# Patient Record
Sex: FEMALE | Race: WHITE | NOT HISPANIC OR LATINO | Employment: OTHER | URBAN - METROPOLITAN AREA
[De-identification: names, ages, dates, MRNs, and addresses within clinical notes are randomized per-mention and may not be internally consistent; named-entity substitution may affect disease eponyms.]

---

## 2017-02-20 ENCOUNTER — HOSPITAL ENCOUNTER (OUTPATIENT)
Dept: LAB | Facility: HOSPITAL | Age: 27
Discharge: HOME | End: 2017-02-20
Attending: NURSE PRACTITIONER | Admitting: NURSE PRACTITIONER

## 2017-02-20 LAB
ABO + RH BLD: NORMAL
ABO + RH BLD: NORMAL

## 2017-02-22 ENCOUNTER — HOSPITAL ENCOUNTER (OUTPATIENT)
Dept: LAB | Facility: HOSPITAL | Age: 27
Discharge: HOME | End: 2017-02-22
Attending: NURSE PRACTITIONER | Admitting: NURSE PRACTITIONER

## 2017-10-28 ENCOUNTER — HOSPITAL ENCOUNTER (OUTPATIENT)
Dept: LAB | Facility: HOSPITAL | Age: 27
Discharge: HOME | End: 2017-10-28
Attending: OBSTETRICS & GYNECOLOGY | Admitting: OBSTETRICS & GYNECOLOGY

## 2017-10-28 LAB — GLUCOSE 1H P 75 G GLC PO SERPL-MCNC: 73 MG/DL (ref 65–139)

## 2021-07-20 ENCOUNTER — OFFICE VISIT (OUTPATIENT)
Dept: FAMILY MEDICINE CLINIC | Facility: CLINIC | Age: 31
End: 2021-07-20

## 2021-07-20 VITALS
BODY MASS INDEX: 21.38 KG/M2 | SYSTOLIC BLOOD PRESSURE: 110 MMHG | HEIGHT: 66 IN | TEMPERATURE: 98 F | DIASTOLIC BLOOD PRESSURE: 78 MMHG | WEIGHT: 133 LBS | HEART RATE: 68 BPM | OXYGEN SATURATION: 98 %

## 2021-07-20 DIAGNOSIS — Z12.4 SCREENING FOR CERVICAL CANCER: ICD-10-CM

## 2021-07-20 DIAGNOSIS — Z13.220 SCREENING CHOLESTEROL LEVEL: ICD-10-CM

## 2021-07-20 DIAGNOSIS — I49.9 IRREGULAR HEART BEAT: ICD-10-CM

## 2021-07-20 DIAGNOSIS — Z11.4 SCREENING FOR HIV (HUMAN IMMUNODEFICIENCY VIRUS): ICD-10-CM

## 2021-07-20 DIAGNOSIS — Z00.01 ENCOUNTER FOR ANNUAL GENERAL MEDICAL EXAMINATION WITH ABNORMAL FINDINGS IN ADULT: Primary | ICD-10-CM

## 2021-07-20 DIAGNOSIS — E55.9 VITAMIN D DEFICIENCY: ICD-10-CM

## 2021-07-20 DIAGNOSIS — Z11.59 ENCOUNTER FOR HEPATITIS C VIRUS SCREENING TEST FOR HIGH RISK PATIENT: ICD-10-CM

## 2021-07-20 DIAGNOSIS — Z13.1 SCREENING FOR DIABETES MELLITUS: ICD-10-CM

## 2021-07-20 DIAGNOSIS — Z91.89 ENCOUNTER FOR HEPATITIS C VIRUS SCREENING TEST FOR HIGH RISK PATIENT: ICD-10-CM

## 2021-07-20 PROBLEM — O28.3 ABNORMAL FETAL ULTRASOUND: Status: RESOLVED | Noted: 2017-10-11 | Resolved: 2021-07-20

## 2021-07-20 PROBLEM — O28.9 ABNORMAL ANTENATAL TEST: Status: RESOLVED | Noted: 2017-10-26 | Resolved: 2021-07-20

## 2021-07-20 PROBLEM — O28.3 ABNORMAL FETAL ULTRASOUND: Status: ACTIVE | Noted: 2017-10-11

## 2021-07-20 PROBLEM — O09.299 HISTORY OF MISCARRIAGE, CURRENTLY PREGNANT: Status: RESOLVED | Noted: 2017-02-01 | Resolved: 2021-07-20

## 2021-07-20 PROBLEM — O28.5 ABNORMAL GENETIC TEST DURING PREGNANCY: Status: RESOLVED | Noted: 2018-01-30 | Resolved: 2021-07-20

## 2021-07-20 PROBLEM — IMO0002 CHOROID PLEXUS CYST OF FETUS: Status: ACTIVE | Noted: 2017-10-26

## 2021-07-20 PROBLEM — Z87.42 HISTORY OF OVARIAN CYST: Status: ACTIVE | Noted: 2017-01-01

## 2021-07-20 PROBLEM — IMO0002 CHOROID PLEXUS CYST OF FETUS: Status: RESOLVED | Noted: 2017-10-26 | Resolved: 2021-07-20

## 2021-07-20 PROBLEM — O09.299 HISTORY OF MISCARRIAGE, CURRENTLY PREGNANT: Status: ACTIVE | Noted: 2017-02-01

## 2021-07-20 PROBLEM — O28.9 ABNORMAL ANTENATAL TEST: Status: ACTIVE | Noted: 2017-10-26

## 2021-07-20 PROBLEM — O28.5 ABNORMAL GENETIC TEST DURING PREGNANCY: Status: ACTIVE | Noted: 2018-01-30

## 2021-07-20 PROCEDURE — 99385 PREV VISIT NEW AGE 18-39: CPT | Performed by: PREVENTIVE MEDICINE

## 2021-07-20 PROCEDURE — 93000 ELECTROCARDIOGRAM COMPLETE: CPT | Performed by: PREVENTIVE MEDICINE

## 2021-07-20 PROCEDURE — 99213 OFFICE O/P EST LOW 20 MIN: CPT | Performed by: PREVENTIVE MEDICINE

## 2021-07-20 NOTE — PROGRESS NOTES
Procedure     ECG 12 Lead    Date/Time: 7/20/2021 1:10 PM  Performed by: Melany Brown MD  Authorized by: Melany Brown MD   Comparison: not compared with previous ECG   Rhythm: sinus rhythm and sinus bradycardia  Rate: normal  Conduction: conduction normal  ST Segments: ST segments normal  T Waves: T waves normal  QRS axis: normal  Other: no other findings  Other findings: left atrial abnormality    Clinical impression: abnormal EKG

## 2021-07-20 NOTE — PROGRESS NOTES
"Kim German is a 31 y.o. female presents for   Chief Complaint   Patient presents with   • Annual Exam     new patient     New patient transitioning into care for an annual wellness exam has been advised to wear sunscreen and to use a seatbelt.  She feels healthy for the most part and does run every day at which time she does not experience any chest palpitations or irregular heartbeat at night when she lays down on her left side she notices her heart is beating hard and irregular and seems to pause or turn over in her chest.  Patient has not had the Covid vaccination as she is planning to become pregnant we advised that she does talk with her gynecologist obstetrician to see what they recommend prior to her becoming pregnant.  EKG showed some left atrial enlargement and bradycardia due to her running most likely.  Patient was placed in 2-week Holter monitor and is to follow-up with cardiology.  Several members of her family have had thyroid difficulty and that will be checked as well today.  Health Maintenance Due   Topic Date Due   • ANNUAL PHYSICAL  Never done   • COVID-19 Vaccine (1) Never done   • HEPATITIS C SCREENING  Never done   • PAP SMEAR  Never done       History of Present Illness     Vitals:    07/20/21 0938 07/20/21 0945   BP: 102/70 110/78   BP Location: Right arm Left arm   Patient Position: Sitting Sitting   Cuff Size: Adult Adult   Pulse: 68    Temp: 98 °F (36.7 °C)    SpO2: 98%    Weight: 60.3 kg (133 lb)    Height: 167.6 cm (66\")      Body mass index is 21.47 kg/m².    No current outpatient medications on file prior to visit.     No current facility-administered medications on file prior to visit.       The following portions of the patient's history were reviewed and updated as appropriate: allergies, current medications, past family history, past medical history, past social history, past surgical history and problem list.    Review of Systems   Constitutional: Negative.  "   HENT: Negative.  Negative for sinus pressure and sore throat.    Eyes: Negative.    Respiratory: Negative.  Negative for cough.    Cardiovascular: Positive for palpitations.   Gastrointestinal: Negative.    Endocrine: Negative.    Genitourinary: Negative.    Musculoskeletal: Negative.    Skin: Negative.    Allergic/Immunologic: Positive for environmental allergies.   Neurological: Negative.    Hematological: Negative.    Psychiatric/Behavioral: The patient is nervous/anxious.        Objective   Physical Exam  Vitals reviewed.   Constitutional:       General: She is not in acute distress.     Appearance: Normal appearance. She is well-developed. She is not ill-appearing or toxic-appearing.   HENT:      Head: Normocephalic and atraumatic.      Right Ear: Tympanic membrane, ear canal and external ear normal.      Left Ear: Tympanic membrane, ear canal and external ear normal.      Nose: Nose normal.   Eyes:      Extraocular Movements: Extraocular movements intact.      Conjunctiva/sclera: Conjunctivae normal.      Pupils: Pupils are equal, round, and reactive to light.   Cardiovascular:      Rate and Rhythm: Normal rate and regular rhythm.      Heart sounds: Normal heart sounds.   Pulmonary:      Effort: Pulmonary effort is normal.      Breath sounds: Normal breath sounds.   Abdominal:      General: Bowel sounds are normal. There is no distension.      Palpations: Abdomen is soft. There is no mass.      Tenderness: There is no abdominal tenderness.   Musculoskeletal:         General: Normal range of motion.      Cervical back: Neck supple.   Skin:     General: Skin is warm.   Neurological:      General: No focal deficit present.      Mental Status: She is alert and oriented to person, place, and time.   Psychiatric:         Mood and Affect: Mood normal.         Behavior: Behavior normal.       PHQ-9 Total Score: 0    Assessment/Plan   Diagnoses and all orders for this visit:    1. Encounter for annual general medical  examination with abnormal findings in adult (Primary)  Comments:  Patient has been advised to wear sunscreen and seatbelt  Orders:  -     CBC Auto Differential    2. Screening for cervical cancer  Comments:  get records from GYN  Orders:  -     Ambulatory Referral to Gynecology    3. Screening for HIV (human immunodeficiency virus)  -     HIV-1 & HIV-2 Antibodies    4. Encounter for hepatitis C virus screening test for high risk patient  -     Hepatitis C Antibody    5. Screening cholesterol level  -     Lipid Panel    6. Screening for diabetes mellitus  -     Comprehensive Metabolic Panel    7. Vitamin D deficiency  -     Vitamin D 25 Hydroxy    8. Irregular heart beat  Comments:  Patient has noticed irregular and forceful heartbeat when she lies down to sleep.  Avid runner and no problems and she is exercising.  No family history of hear  Orders:  -     Sedimentation Rate  -     C-reactive Protein  -     TSH  -     Magnesium  -     Holter Monitor - 72 Hour Up To 15 Days; Future  -     Ambulatory Referral to Cardiology  -     ECG 12 Lead        Patient Instructions   Speak with Dr. Ferrell about Covid vaccination    12 hour fast for labs.

## 2021-07-21 ENCOUNTER — CLINICAL SUPPORT (OUTPATIENT)
Dept: FAMILY MEDICINE CLINIC | Facility: CLINIC | Age: 31
End: 2021-07-21

## 2021-07-21 DIAGNOSIS — Z87.42 HISTORY OF OVARIAN CYST: ICD-10-CM

## 2021-07-21 DIAGNOSIS — I49.9 IRREGULAR HEART BEAT: ICD-10-CM

## 2021-07-21 PROCEDURE — 86803 HEPATITIS C AB TEST: CPT | Performed by: PREVENTIVE MEDICINE

## 2021-07-21 PROCEDURE — 36415 COLL VENOUS BLD VENIPUNCTURE: CPT | Performed by: PREVENTIVE MEDICINE

## 2021-07-21 PROCEDURE — 80061 LIPID PANEL: CPT | Performed by: PREVENTIVE MEDICINE

## 2021-07-21 PROCEDURE — 84443 ASSAY THYROID STIM HORMONE: CPT | Performed by: PREVENTIVE MEDICINE

## 2021-07-21 PROCEDURE — G0432 EIA HIV-1/HIV-2 SCREEN: HCPCS | Performed by: PREVENTIVE MEDICINE

## 2021-07-21 PROCEDURE — 83735 ASSAY OF MAGNESIUM: CPT | Performed by: PREVENTIVE MEDICINE

## 2021-07-21 PROCEDURE — 82306 VITAMIN D 25 HYDROXY: CPT | Performed by: PREVENTIVE MEDICINE

## 2021-07-21 PROCEDURE — 80053 COMPREHEN METABOLIC PANEL: CPT | Performed by: PREVENTIVE MEDICINE

## 2021-07-21 PROCEDURE — 85652 RBC SED RATE AUTOMATED: CPT | Performed by: PREVENTIVE MEDICINE

## 2021-07-21 PROCEDURE — 86140 C-REACTIVE PROTEIN: CPT | Performed by: PREVENTIVE MEDICINE

## 2021-07-21 PROCEDURE — 85025 COMPLETE CBC W/AUTO DIFF WBC: CPT | Performed by: PREVENTIVE MEDICINE

## 2021-07-21 NOTE — PROGRESS NOTES
Venipuncture Blood Specimen Collection  Venipuncture performed in left arm by Mariana Nagel MA with good hemostasis. Patient tolerated the procedure well without complications.   07/21/21   ELIE Garrett MD

## 2021-07-22 ENCOUNTER — TELEPHONE (OUTPATIENT)
Dept: FAMILY MEDICINE CLINIC | Facility: CLINIC | Age: 31
End: 2021-07-22

## 2021-07-22 LAB
25(OH)D3 SERPL-MCNC: 37 NG/ML (ref 30–100)
ALBUMIN SERPL-MCNC: 4.8 G/DL (ref 3.5–5.2)
ALBUMIN/GLOB SERPL: 2.5 G/DL
ALP SERPL-CCNC: 43 U/L (ref 39–117)
ALT SERPL W P-5'-P-CCNC: 9 U/L (ref 1–33)
ANION GAP SERPL CALCULATED.3IONS-SCNC: 8.9 MMOL/L (ref 5–15)
AST SERPL-CCNC: 18 U/L (ref 1–32)
BASOPHILS # BLD AUTO: 0.07 10*3/MM3 (ref 0–0.2)
BASOPHILS NFR BLD AUTO: 1.6 % (ref 0–1.5)
BILIRUB SERPL-MCNC: 0.5 MG/DL (ref 0–1.2)
BUN SERPL-MCNC: 14 MG/DL (ref 6–20)
BUN/CREAT SERPL: 17.5 (ref 7–25)
CALCIUM SPEC-SCNC: 9.2 MG/DL (ref 8.6–10.5)
CHLORIDE SERPL-SCNC: 103 MMOL/L (ref 98–107)
CHOLEST SERPL-MCNC: 165 MG/DL (ref 0–200)
CO2 SERPL-SCNC: 26.1 MMOL/L (ref 22–29)
CREAT SERPL-MCNC: 0.8 MG/DL (ref 0.57–1)
CRP SERPL-MCNC: <0.3 MG/DL (ref 0–0.5)
DEPRECATED RDW RBC AUTO: 43.8 FL (ref 37–54)
EOSINOPHIL # BLD AUTO: 0.2 10*3/MM3 (ref 0–0.4)
EOSINOPHIL NFR BLD AUTO: 4.5 % (ref 0.3–6.2)
ERYTHROCYTE [DISTWIDTH] IN BLOOD BY AUTOMATED COUNT: 12.7 % (ref 12.3–15.4)
ERYTHROCYTE [SEDIMENTATION RATE] IN BLOOD: <1 MM/HR (ref 0–20)
GFR SERPL CREATININE-BSD FRML MDRD: 83 ML/MIN/1.73
GLOBULIN UR ELPH-MCNC: 1.9 GM/DL
GLUCOSE SERPL-MCNC: 85 MG/DL (ref 65–99)
HCT VFR BLD AUTO: 38.6 % (ref 34–46.6)
HCV AB SER DONR QL: NORMAL
HDLC SERPL-MCNC: 69 MG/DL (ref 40–60)
HGB BLD-MCNC: 13.1 G/DL (ref 12–15.9)
HIV1+2 AB SER QL: NORMAL
IMM GRANULOCYTES # BLD AUTO: 0.01 10*3/MM3 (ref 0–0.05)
IMM GRANULOCYTES NFR BLD AUTO: 0.2 % (ref 0–0.5)
LDLC SERPL CALC-MCNC: 85 MG/DL (ref 0–100)
LDLC/HDLC SERPL: 1.24 {RATIO}
LYMPHOCYTES # BLD AUTO: 1.21 10*3/MM3 (ref 0.7–3.1)
LYMPHOCYTES NFR BLD AUTO: 27.5 % (ref 19.6–45.3)
MAGNESIUM SERPL-MCNC: 2.2 MG/DL (ref 1.6–2.6)
MCH RBC QN AUTO: 31.9 PG (ref 26.6–33)
MCHC RBC AUTO-ENTMCNC: 33.9 G/DL (ref 31.5–35.7)
MCV RBC AUTO: 93.9 FL (ref 79–97)
MONOCYTES # BLD AUTO: 0.34 10*3/MM3 (ref 0.1–0.9)
MONOCYTES NFR BLD AUTO: 7.7 % (ref 5–12)
NEUTROPHILS NFR BLD AUTO: 2.57 10*3/MM3 (ref 1.7–7)
NEUTROPHILS NFR BLD AUTO: 58.5 % (ref 42.7–76)
NRBC BLD AUTO-RTO: 0 /100 WBC (ref 0–0.2)
PLATELET # BLD AUTO: 219 10*3/MM3 (ref 140–450)
PMV BLD AUTO: 11.5 FL (ref 6–12)
POTASSIUM SERPL-SCNC: 4.3 MMOL/L (ref 3.5–5.2)
PROT SERPL-MCNC: 6.7 G/DL (ref 6–8.5)
RBC # BLD AUTO: 4.11 10*6/MM3 (ref 3.77–5.28)
SODIUM SERPL-SCNC: 138 MMOL/L (ref 136–145)
TRIGL SERPL-MCNC: 52 MG/DL (ref 0–150)
TSH SERPL DL<=0.05 MIU/L-ACNC: 2.58 UIU/ML (ref 0.27–4.2)
VLDLC SERPL-MCNC: 11 MG/DL (ref 5–40)
WBC # BLD AUTO: 4.4 10*3/MM3 (ref 3.4–10.8)

## 2021-07-22 NOTE — TELEPHONE ENCOUNTER
HUB TO READ    ----- Message from Melany Bronw MD sent at 7/22/2021  7:13 AM EDT -----  Labs all look normal, including thyroid-followup as planned

## 2021-08-17 ENCOUNTER — PATIENT ROUNDING (BHMG ONLY) (OUTPATIENT)
Dept: FAMILY MEDICINE CLINIC | Facility: CLINIC | Age: 31
End: 2021-08-17

## 2021-08-17 NOTE — PROGRESS NOTES
August 17, 2021    Hello, may I speak with Salvador German?    My name is Clau Smith Practice Manager      I am  with [facility]  [address] [place] [address] [place] [id number].    Before we get started may I verify your date of birth? 1990    I am calling to officially welcome you to our practice and ask about your recent visit. Is this a good time to talk? yes    Tell me about your visit with us. What things went well?  no changes everything went great . No concerns       We're always looking for ways to make our patients' experiences even better. Do you have recommendations on ways we may improve?  no everything went great    Overall were you satisfied with your first visit to our practice? yes       I appreciate you taking the time to speak with me today. Is there anything else I can do for you? no      Thank you, and have a great day.

## 2021-09-01 ENCOUNTER — TELEPHONE (OUTPATIENT)
Dept: FAMILY MEDICINE CLINIC | Facility: CLINIC | Age: 31
End: 2021-09-01

## 2022-06-29 ENCOUNTER — TELEPHONE (OUTPATIENT)
Dept: FAMILY MEDICINE CLINIC | Facility: CLINIC | Age: 32
End: 2022-06-29

## 2022-06-29 NOTE — TELEPHONE ENCOUNTER
Caller:     Relationship:    Salvador German (Self) [phone] (H)       What is the medical concern/diagnosis:     What specialty or service is being requested:   CARDIOLOGY    What is the provider, practice or medical service name:     What is the office location:     What is the office phone number: *    Any additional details:  WAS NOT SURE IF THE ONE IN THE SYSTEM HAD  YET      PLEASE ADVISE

## 2022-06-29 NOTE — TELEPHONE ENCOUNTER
Provided patient with number to  Scheduling and advised if order is to old then patient would need to schedule and office visit to discuss referral.

## 2022-08-01 ENCOUNTER — OFFICE VISIT (OUTPATIENT)
Dept: CARDIOLOGY | Facility: CLINIC | Age: 32
End: 2022-08-01

## 2022-08-01 VITALS
HEIGHT: 66 IN | SYSTOLIC BLOOD PRESSURE: 104 MMHG | DIASTOLIC BLOOD PRESSURE: 70 MMHG | WEIGHT: 159 LBS | HEART RATE: 73 BPM | BODY MASS INDEX: 25.55 KG/M2

## 2022-08-01 DIAGNOSIS — R00.2 PALPITATIONS: Primary | ICD-10-CM

## 2022-08-01 DIAGNOSIS — R01.1 HEART MURMUR: ICD-10-CM

## 2022-08-01 PROCEDURE — 99204 OFFICE O/P NEW MOD 45 MIN: CPT | Performed by: INTERNAL MEDICINE

## 2022-08-01 PROCEDURE — 93000 ELECTROCARDIOGRAM COMPLETE: CPT | Performed by: INTERNAL MEDICINE

## 2022-08-01 RX ORDER — PRENATAL VIT NO.126/IRON/FOLIC 28MG-0.8MG
TABLET ORAL DAILY
COMMUNITY

## 2022-08-01 NOTE — PROGRESS NOTES
[facility] New Patient Office Note     Encounter Date:22  Patient:Salvador German  :1990  MRN:[id number]    Referring Provider: Melany Brown MD    Consulted for: Palpitations    Chief Complaint:   Chief Complaint   Patient presents with   • Irregular Heart Beat     History of Presenting Illness:      Ms. German is a 32 y.o. woman with no significant past medical history presents to our office for initial valuation regarding symptoms of palpitations.  She is actually been having palpitations for about a year and is currently 7 months pregnant with planned  at the end of September.  In general she is doing well with her pregnancy.  She does notice she has been having continued palpitations that have been a little bit worse as of late.  She does notice they tend to be worse at night typically if she is laying on her left side.  Again these were present even before being pregnant and she was concerned given that she has a strong family history of atrial fibrillation.  Both her grandparents, father, and even her sister is only a few years older than her in her 30s also was recently diagnosed with atrial fibrillation.  She is wanting to make sure that this was not the case for her and also wanted to make sure that her heart was healthy and strong enough for her pregnancy.  This is actually her fourth pregnancy.  All other pregnancies have been uncomplicated beyond some borderline gestational diabetes.    Review of Systems:  Review of Systems   Constitutional: Negative.   HENT: Negative.    Eyes: Negative.    Cardiovascular: Positive for palpitations.   Respiratory: Negative.    Endocrine: Negative.    Hematologic/Lymphatic: Negative.    Skin: Negative.    Musculoskeletal: Negative.    Gastrointestinal: Negative.    Genitourinary: Negative.    Neurological: Negative.    Psychiatric/Behavioral: Negative.    Allergic/Immunologic: Negative.        Current Outpatient Medications on  "File Prior to Visit   Medication Sig Dispense Refill   • prenatal vitamin (prenatal, CLASSIC, vitamin) tablet Take  by mouth Daily.       No current facility-administered medications on file prior to visit.       Allergies   Allergen Reactions   • Sulfa Antibiotics Hives       Past Medical History:   Diagnosis Date   • Gestational diabetes    • Irregular heart beat        Past Surgical History:   Procedure Laterality Date   •  SECTION      X 2   • EAR TUBES     • WISDOM TOOTH EXTRACTION         Social History     Socioeconomic History   • Marital status:    Tobacco Use   • Smoking status: Never Smoker   • Smokeless tobacco: Current User   Vaping Use   • Vaping Use: Never used   Substance and Sexual Activity   • Alcohol use: Yes     Comment: socially/caffeine use   • Drug use: Never   • Sexual activity: Defer       Family History   Problem Relation Age of Onset   • Hypothyroidism Mother    • Thyroid cancer Father    • Hypothyroidism Father    • Atrial fibrillation Father    • Atrial fibrillation Sister    • Diabetes Maternal Grandmother    • Atrial fibrillation Paternal Grandmother    • Atrial fibrillation Paternal Grandfather        The following portions of the patient's history were reviewed and updated as appropriate: allergies, current medications, past family history, past medical history, past social history, past surgical history and problem list.       Objective:       Vitals:    22 1051   BP: 104/70   BP Location: Left arm   Patient Position: Sitting   Pulse: 73   Weight: 72.1 kg (159 lb)   Height: 167.6 cm (66\")       Body mass index is 25.66 kg/m².    Physical Exam:  Constitutional: Well appearing, Well-developed, No acute distress   HENT: Oropharynx clear and membrane moist  Eyes: Normal conjunctiva, no sclera icterus.  Neck: Supple, no carotid bruit bilaterally.  Cardiovascular: Regular rate and rhythm, Early peaking systolic murmur over the right upper sternal border, No bilateral " lower extremity edema.  Pulmonary: Normal respiratory effort, Normal lung sounds, no wheezing.  Abdominal: Soft, nontender, no hepatosplenomegaly, liver is non-pulsatile.  Neurological: Alert and orient x 3.   Skin: Warm, dry, no ecchymosis, no rash.  Psych: Appropriate mood and affect. Normal judgment and insight.      Lab Results   Component Value Date     07/21/2021     01/30/2018    K 4.3 07/21/2021    K 3.5 01/30/2018     07/21/2021     01/30/2018    CO2 26.1 07/21/2021    CO2 25 01/30/2018    BUN 14 07/21/2021    BUN 10 01/30/2018    CREATININE 0.80 07/21/2021    CREATININE 0.6 (L) 01/30/2018    EGFRIFNONA 83 07/21/2021    GLUCOSE 85 07/21/2021    CALCIUM 9.2 07/21/2021    CALCIUM 9.0 01/30/2018    ALBUMIN 4.80 07/21/2021    ALBUMIN 4.4 01/30/2018    BILITOT 0.5 07/21/2021    BILITOT 0.4 01/30/2018    AST 18 07/21/2021    AST 25 01/30/2018    ALT 9 07/21/2021    ALT 33 01/30/2018     Lab Results   Component Value Date    WBC 8.35 03/21/2022    WBC 4.40 07/21/2021    HGB 12.7 03/21/2022    HGB 13.1 07/21/2021    HCT 38.0 03/21/2022    HCT 38.6 07/21/2021    MCV 96.4 03/21/2022    MCV 93.9 07/21/2021     03/21/2022     07/21/2021     Lab Results   Component Value Date    CHOL 165 07/21/2021    TRIG 52 07/21/2021    HDL 69 (H) 07/21/2021    LDL 85 07/21/2021     No results found for: PROBNP, BNP  No results found for: CKTOTAL, CKMB, CKMBINDEX, TROPONINI, TROPONINT  Lab Results   Component Value Date    TSH 2.580 07/21/2021         ECG 12 Lead    Date/Time: 8/1/2022 12:07 PM  Performed by: Uche Chavez MD  Authorized by: Uche Chavez MD   Comparison: compared with previous ECG from 7/19/2022  Comparison to previous ECG: Left atrial enlargement no longer seen  Rhythm: sinus rhythm              Assessment:          Diagnosis Plan   1. Palpitations  Adult Transthoracic Echo Complete W/ Cont if Necessary Per Protocol    Holter Monitor - 24 Hour   2. Heart murmur  Adult  Transthoracic Echo Complete W/ Cont if Necessary Per Protocol          Plan:       Ms. German is a 32 y.o. woman with no significant past medical history presents to our office for initial valuation regarding symptoms of palpitations.  Overall hard to pin down exactly what her symptoms may be.  They simply may be APCs or even a positional issue with her being pregnant and laying on her left side.  That being said she does have a very strong family history of atrial fibrillation and she does have an EKG which did have left atrial enlargement which could simply be lead placement but given her family history, symptoms, and heart murmur on exam it would not be unreasonable to assess for normal heart structure with an echocardiogram.  This would help us define her heart murmur better which again hopefully just a flow murmur.  Finally would get a 24-hour Holter monitor to assess for underlying heart rhythm make sure were not missing any arrhythmias or anything that we need to follow closely follow.  If her tests are normal then I think she can see us back on an as-needed basis and we can reassess if symptoms continue to persist or worsen post pregnancy more longer-term monitoring but at this point if testing is normal and no significant structural or valvular abnormalities noted on echocardiogram that she will do just fine and can follow her symptoms symptomatically.    Palpitations:  · Follow-up on echocardiogram and Holter monitor  · Lab work within the last year during times of symptoms of demonstrated normal electrolytes and thyroid and hemoglobin    Heart murmur:  · Hopefully just represents a flow murmur in the setting of pregnancy but given symptoms of palpitations and family history of early atrial fibrillation we will follow-up on echocardiogram    Follow-up:  As needed based upon cardiac test results and symptoms.      Thank you for allowing me to participate in the care of Salvador German. Feel free to  contact me directly with any further questions or concerns.    Uche Chavez MD  [facility]  08/01/22  12:09 EDT

## 2022-08-04 ENCOUNTER — HOSPITAL ENCOUNTER (OUTPATIENT)
Dept: CARDIOLOGY | Facility: HOSPITAL | Age: 32
Discharge: HOME | End: 2022-08-04
Admitting: INTERNAL MEDICINE

## 2022-08-04 VITALS
WEIGHT: 159 LBS | DIASTOLIC BLOOD PRESSURE: 68 MMHG | SYSTOLIC BLOOD PRESSURE: 122 MMHG | BODY MASS INDEX: 25.55 KG/M2 | HEART RATE: 75 BPM | HEIGHT: 66 IN

## 2022-08-04 DIAGNOSIS — R01.1 HEART MURMUR: ICD-10-CM

## 2022-08-04 DIAGNOSIS — R00.2 PALPITATIONS: ICD-10-CM

## 2022-08-04 LAB
AORTIC ARCH: 1.7 CM
AORTIC DIMENSIONLESS INDEX: 0.7 (DI)
ASCENDING AORTA: 2.7 CM
BH CV ECHO MEAS - ACS: 1.8 CM
BH CV ECHO MEAS - AO MAX PG: 11.4 MMHG
BH CV ECHO MEAS - AO MEAN PG: 5 MMHG
BH CV ECHO MEAS - AO ROOT DIAM: 2.7 CM
BH CV ECHO MEAS - AO V2 MAX: 169 CM/SEC
BH CV ECHO MEAS - AO V2 VTI: 31.4 CM
BH CV ECHO MEAS - AVA(I,D): 2.15 CM2
BH CV ECHO MEAS - EDV(CUBED): 148.9 ML
BH CV ECHO MEAS - EDV(MOD-SP2): 100 ML
BH CV ECHO MEAS - EDV(MOD-SP4): 106 ML
BH CV ECHO MEAS - EF(MOD-BP): 58.5 %
BH CV ECHO MEAS - EF(MOD-SP2): 58 %
BH CV ECHO MEAS - EF(MOD-SP4): 58.5 %
BH CV ECHO MEAS - ESV(CUBED): 46.7 ML
BH CV ECHO MEAS - ESV(MOD-SP2): 42 ML
BH CV ECHO MEAS - ESV(MOD-SP4): 44 ML
BH CV ECHO MEAS - FS: 32.1 %
BH CV ECHO MEAS - IVS/LVPW: 1.14 CM
BH CV ECHO MEAS - IVSD: 0.8 CM
BH CV ECHO MEAS - LAT PEAK E' VEL: 20.3 CM/SEC
BH CV ECHO MEAS - LV DIASTOLIC VOL/BSA (35-75): 58.4 CM2
BH CV ECHO MEAS - LV MASS(C)D: 138.3 GRAMS
BH CV ECHO MEAS - LV MAX PG: 3.8 MMHG
BH CV ECHO MEAS - LV MEAN PG: 2 MMHG
BH CV ECHO MEAS - LV SYSTOLIC VOL/BSA (12-30): 24.3 CM2
BH CV ECHO MEAS - LV V1 MAX: 97.5 CM/SEC
BH CV ECHO MEAS - LV V1 VTI: 21.5 CM
BH CV ECHO MEAS - LVIDD: 5.3 CM
BH CV ECHO MEAS - LVIDS: 3.6 CM
BH CV ECHO MEAS - LVOT AREA: 3.1 CM2
BH CV ECHO MEAS - LVOT DIAM: 2 CM
BH CV ECHO MEAS - LVPWD: 0.7 CM
BH CV ECHO MEAS - MED PEAK E' VEL: 8.5 CM/SEC
BH CV ECHO MEAS - MV A DUR: 0.12 SEC
BH CV ECHO MEAS - MV A MAX VEL: 48 CM/SEC
BH CV ECHO MEAS - MV DEC SLOPE: 221 CM/SEC2
BH CV ECHO MEAS - MV DEC TIME: 0.3 MSEC
BH CV ECHO MEAS - MV E MAX VEL: 73.2 CM/SEC
BH CV ECHO MEAS - MV E/A: 1.53
BH CV ECHO MEAS - MV MAX PG: 2.45 MMHG
BH CV ECHO MEAS - MV MEAN PG: 1 MMHG
BH CV ECHO MEAS - MV P1/2T: 103.4 MSEC
BH CV ECHO MEAS - MV V2 VTI: 24.3 CM
BH CV ECHO MEAS - MVA(P1/2T): 2.13 CM2
BH CV ECHO MEAS - MVA(VTI): 2.8 CM2
BH CV ECHO MEAS - PA ACC TIME: 0.14 SEC
BH CV ECHO MEAS - PA PR(ACCEL): 14.6 MMHG
BH CV ECHO MEAS - PA V2 MAX: 103 CM/SEC
BH CV ECHO MEAS - PULM A REVS DUR: 0.15 SEC
BH CV ECHO MEAS - PULM A REVS VEL: 24.3 CM/SEC
BH CV ECHO MEAS - PULM DIAS VEL: 32.5 CM/SEC
BH CV ECHO MEAS - PULM S/D: 1.55
BH CV ECHO MEAS - PULM SYS VEL: 50.4 CM/SEC
BH CV ECHO MEAS - RAP SYSTOLE: 3 MMHG
BH CV ECHO MEAS - RV MAX PG: 2.6 MMHG
BH CV ECHO MEAS - RV V1 MAX: 80.2 CM/SEC
BH CV ECHO MEAS - RV V1 VTI: 14.4 CM
BH CV ECHO MEAS - RVSP: 24.3 MMHG
BH CV ECHO MEAS - SI(MOD-SP2): 32 ML/M2
BH CV ECHO MEAS - SI(MOD-SP4): 34.2 ML/M2
BH CV ECHO MEAS - SUP REN AO DIAM: 1.5 CM
BH CV ECHO MEAS - SV(LVOT): 67.5 ML
BH CV ECHO MEAS - SV(MOD-SP2): 58 ML
BH CV ECHO MEAS - SV(MOD-SP4): 62 ML
BH CV ECHO MEAS - TAPSE (>1.6): 2.26 CM
BH CV ECHO MEAS - TR MAX PG: 21.3 MMHG
BH CV ECHO MEAS - TR MAX VEL: 231 CM/SEC
BH CV ECHO MEASUREMENTS AVERAGE E/E' RATIO: 5.08
BH CV XLRA - RV BASE: 4.6 CM
BH CV XLRA - RV LENGTH: 7.8 CM
BH CV XLRA - RV MID: 3.5 CM
BH CV XLRA - TDI S': 10.8 CM/SEC
LEFT ATRIUM VOLUME INDEX: 23 ML/M2
MAXIMAL PREDICTED HEART RATE: 187 BPM
SINUS: 2.33 CM
STJ: 2.41 CM
STRESS TARGET HR: 159 BPM

## 2022-08-04 PROCEDURE — 93306 TTE W/DOPPLER COMPLETE: CPT | Performed by: INTERNAL MEDICINE

## 2022-08-04 PROCEDURE — 93306 TTE W/DOPPLER COMPLETE: CPT

## 2022-08-11 ENCOUNTER — TELEPHONE (OUTPATIENT)
Dept: CARDIOLOGY | Facility: CLINIC | Age: 32
End: 2022-08-11

## 2022-08-11 NOTE — TELEPHONE ENCOUNTER
Called and talked with patient regarding both echocardiogram and monitor results.  Both of which were normal.  Overall patient is doing great.  From a cardiac standpoint we will follow up with her on an as-needed basis for any new cardiac issues.

## 2022-12-07 PROBLEM — Z98.891 HISTORY OF CESAREAN DELIVERY: Status: ACTIVE | Noted: 2022-03-21

## 2022-12-07 PROBLEM — K42.9 UMBILICAL HERNIA: Status: ACTIVE | Noted: 2022-12-07

## 2022-12-07 PROBLEM — Z87.59 HISTORY OF PRIOR PREGNANCY WITH IUGR NEWBORN: Status: ACTIVE | Noted: 2022-03-21

## 2025-05-26 NOTE — PATIENT INSTRUCTIONS
Health Maintenance Due   Topic Date Due    PAP SMEAR  Never done    ANNUAL PHYSICAL  07/20/2022    COVID-19 Vaccine (3 - 2024-25 season) 09/01/2024    12 hour fast for labs.    Trial Pepcid 20 once or twice daily the week before period.

## 2025-05-29 ENCOUNTER — OFFICE VISIT (OUTPATIENT)
Dept: FAMILY MEDICINE CLINIC | Facility: CLINIC | Age: 35
End: 2025-05-29
Payer: COMMERCIAL

## 2025-05-29 VITALS
TEMPERATURE: 98.6 F | SYSTOLIC BLOOD PRESSURE: 107 MMHG | OXYGEN SATURATION: 98 % | DIASTOLIC BLOOD PRESSURE: 65 MMHG | WEIGHT: 138.8 LBS | HEIGHT: 66 IN | HEART RATE: 80 BPM | BODY MASS INDEX: 22.31 KG/M2

## 2025-05-29 DIAGNOSIS — Z00.01 ENCOUNTER FOR ANNUAL GENERAL MEDICAL EXAMINATION WITH ABNORMAL FINDINGS IN ADULT: Primary | ICD-10-CM

## 2025-05-29 DIAGNOSIS — Z87.42 HISTORY OF OVARIAN CYST: ICD-10-CM

## 2025-05-29 DIAGNOSIS — R10.13 EPIGASTRIC PAIN: ICD-10-CM

## 2025-05-29 DIAGNOSIS — Z12.4 SCREENING FOR CERVICAL CANCER: ICD-10-CM

## 2025-05-29 DIAGNOSIS — Z13.1 SCREENING FOR DIABETES MELLITUS: ICD-10-CM

## 2025-05-29 DIAGNOSIS — I49.9 IRREGULAR HEART BEAT: ICD-10-CM

## 2025-05-29 DIAGNOSIS — Z13.220 SCREENING CHOLESTEROL LEVEL: ICD-10-CM

## 2025-05-29 DIAGNOSIS — K42.9 UMBILICAL HERNIA WITHOUT OBSTRUCTION AND WITHOUT GANGRENE: ICD-10-CM

## 2025-05-29 DIAGNOSIS — R21 RASH: ICD-10-CM

## 2025-05-29 DIAGNOSIS — E55.9 VITAMIN D DEFICIENCY: ICD-10-CM

## 2025-05-29 RX ORDER — CLOTRIMAZOLE AND BETAMETHASONE DIPROPIONATE 10; .64 MG/G; MG/G
1 CREAM TOPICAL 2 TIMES DAILY
Qty: 45 G | Refills: 1 | Status: SHIPPED | OUTPATIENT
Start: 2025-05-29

## 2025-05-29 NOTE — PROGRESS NOTES
Subjective   Salvador German is a 34 y.o. female presents for   Chief Complaint   Patient presents with    Annual Exam    Rash     Rash in both armpits that comes and goes. Has been going on since march, accompanied but tingling sensation throughout chest/breasts.        Health Maintenance Due   Topic Date Due    PAP SMEAR  Never done    ANNUAL PHYSICAL  07/20/2022    COVID-19 Vaccine (3 - 2024-25 season) 09/01/2024   She presents today for annual wellness exam and has been advised to wear sunscreen and a seatbelt.    Patient is also here complaining of a pruritic annular vesicular bilateral axillary rash that causes tingling into her chest wall and breasts.  She has tried steroid cream on it which improves it some tried to be without deodorant.  In the meantime we will ask her to use Dial antibacterial soap and only the betamethasone clotrimazole cream if this fails to improve we will send her to the dermatologist.  New problem #2 is epigastric pain that starts during the week before her periods and causes her to have to take to her bed.  She has not vomited does feel somewhat nauseated no change in her bowels has not had any weight loss and this has been going on for the last 6 months the week before her period.  We will try famotidine for this and if symptoms persist will refer to gastroenterology order has been placed.    Rash     History of Present Illness  The patient is a 34-year-old female who presents today for an annual wellness exam, screening for cervical cancer, regular heartbeat, umbilical hernia, history of ovarian cyst, screening cholesterol level, screening for diabetes, vitamin D deficiency, rash, and body mass index of 22.    She reports the onset of a bilateral axillary rash on 03/19/2025, which has progressively worsened. The rash is characterized by intermittent exacerbations and remissions. She has been unable to use deodorant due to the associated irritation but notes that the rash persists  even without its use. She has attempted treatment with hydrocortisone cream and Benadryl cream but has not tried any antifungal creams. She also reports unusual tingling sensations extending into her breast and through her armpit but does not experience any pain or milk leakage. No abnormal lumps have been detected in her breast or armpit. The rash is localized to the axillary region and has not spread elsewhere. She does not own any pets. The rash is described as dry and plaque-like, without any oozing or pustules. Regular exercise results in significant sweating, and she attempts to maintain hygiene with skin wipes. She has not previously consulted a dermatologist. She recalls a similar unilateral axillary rash during her pregnancy, which she attributes to hormonal changes, and which resolved postpartum.    She experiences abdominal pain approximately one week prior to her menstrual cycle, typically in the evenings, and occasionally upon waking. The pain is localized under her rib cage, radiating towards her back, and is described as a burning sensation that intensifies before subsiding. The pain does not trigger bowel movements or vomiting but is associated with nausea. She manages the discomfort by sipping water and lying down. The pain can persist for several days in a row. She has been monitoring this pattern for the past year and suspects a hormonal etiology. She has a history of a sensitive stomach and has attempted dietary modifications, including the elimination of eggs and gluten, which have resulted in some symptom relief. Applying pressure to the affected area provides some relief. She has not previously had ulcers. She also reports a history of constipation.    She had an EKG in the office last time and then had a follow-up with cardiology. She had a Holter monitor and a stress test. The doctor told her that she had a murmur, which she had never heard before in her life. She has not been having any  "chest pressure or heart flutters.    She is up to date on her Pap smear. Dr. Trevino with [facility] is her OB-GYN, and she had her Pap smear through him.    She has not seen an eye doctor within the year but has seen a dentist.    GYNECOLOGICAL HISTORY:  - Menstrual Pain: Yes    PAST SURGICAL HISTORY:  She had an umbilical hernia after her , but it went away. She had an ovarian cyst in .    Vitals:    25 1309 25 1314   BP: 98/71 107/65   BP Location: Left arm Right arm   Patient Position: Sitting Sitting   Cuff Size: Adult Adult   Pulse: 80    Temp: 98.6 °F (37 °C)    TempSrc: Infrared    SpO2: 98%    Weight: 63 kg (138 lb 12.8 oz)    Height: 167.6 cm (65.98\")      Body mass index is 22.41 kg/m².    Current Outpatient Medications on File Prior to Visit   Medication Sig Dispense Refill    prenatal vitamin (prenatal, CLASSIC, vitamin) tablet Take  by mouth Daily. (Patient not taking: Reported on 2025)       No current facility-administered medications on file prior to visit.       The following portions of the patient's history were reviewed and updated as appropriate: allergies, current medications, past family history, past medical history, past social history, past surgical history, and problem list.    Review of Systems   Cardiovascular:  Positive for palpitations.   Gastrointestinal:  Positive for abdominal pain.   Skin:  Positive for rash.       Objective   Physical Exam  Vitals reviewed.   Constitutional:       General: She is not in acute distress.     Appearance: Normal appearance. She is well-developed. She is not ill-appearing or toxic-appearing.   HENT:      Head: Normocephalic and atraumatic.      Right Ear: Tympanic membrane, ear canal and external ear normal.      Left Ear: Tympanic membrane, ear canal and external ear normal.      Nose: Nose normal.      Mouth/Throat:      Mouth: Mucous membranes are moist.      Pharynx: No posterior oropharyngeal erythema. "   Eyes:      Extraocular Movements: Extraocular movements intact.      Conjunctiva/sclera: Conjunctivae normal.      Pupils: Pupils are equal, round, and reactive to light.   Neck:      Vascular: No carotid bruit.   Cardiovascular:      Rate and Rhythm: Normal rate and regular rhythm.      Heart sounds: Normal heart sounds.   Pulmonary:      Effort: Pulmonary effort is normal.      Breath sounds: Normal breath sounds.   Abdominal:      General: Bowel sounds are normal. There is no distension.      Palpations: Abdomen is soft. There is no mass.      Tenderness: There is no abdominal tenderness. There is no right CVA tenderness or left CVA tenderness.   Musculoskeletal:         General: Normal range of motion.      Cervical back: Neck supple. No tenderness.      Right lower leg: No edema.      Left lower leg: No edema.   Lymphadenopathy:      Cervical: No cervical adenopathy.   Skin:     General: Skin is warm.      Findings: Rash present.   Neurological:      General: No focal deficit present.      Mental Status: She is alert and oriented to person, place, and time.   Psychiatric:         Mood and Affect: Mood normal.         Behavior: Behavior normal.       Physical Exam  Mouth/Throat: Mucous membranes moist, no erythema, no exudate  Neck: Supple, no abnormalities  Respiratory: Clear to auscultation, no wheezing, rales or rhonchi  Cardiovascular: Regular rate and rhythm, no murmurs, rubs, or gallops. Heart rate 78  Gastrointestinal: Soft, no tenderness, no distention, no masses  Skin: Rash under both arms, described as patches, no pustules, no oozing, no individual pimples, resembles plaques    PHQ-9 Total Score:    Results  Diagnostic Testing   - EKG: Normal   - Holter monitor: Normal   - Stress test: Normal   - Echocardiogram: Normal         Assessment & Plan   Diagnoses and all orders for this visit:    1. Encounter for annual general medical examination with abnormal findings in adult (Primary)  -     CBC Auto  Differential; Future    2. Screening for cervical cancer    3. Irregular heart beat  -     Magnesium; Future  -     TSH Rfx On Abnormal To Free T4; Future    4. Umbilical hernia without obstruction and without gangrene    5. History of ovarian cyst    6. Screening cholesterol level  -     Lipid Panel; Future    7. Screening for diabetes mellitus  -     Comprehensive Metabolic Panel; Future    8. Vitamin D deficiency  -     Vitamin D,25-Hydroxy; Future    9. Rash  -     Ambulatory Referral to Dermatology    10. Body mass index (BMI) of 22.0 to 22.9 in adult    11. Epigastric pain  -     Ambulatory Referral to Gastroenterology    Other orders  -     clotrimazole-betamethasone (LOTRISONE) 1-0.05 % cream; Apply 1 Application topically to the appropriate area as directed 2 (Two) Times a Day.  Dispense: 45 g; Refill: 1      Assessment & Plan  1. Bilateral axillary rash.  - The rash appears to be localized, with no evidence of pustules or oozing. The tingling sensation may be due to nerve irritation.  - Hydrocortisone and Benadryl creams have been tried without significant improvement.  - A prescription for betamethasone clotrimazole cream has been provided, to be applied thinly twice daily. She is advised to avoid deodorant use as much as possible and to cleanse the area with water and Dial soap.  - A referral to dermatology has been initiated.    2. Epigastric pain.  - The symptoms suggest a potential acid component, possibly related to endometriosis. The pain is cyclical, occurring the week before her menstrual cycle.  - Tums have provided some relief. Famotidine (Pepcid) 20 mg once or twice daily during the week preceding her menstrual cycle has been recommended.  - A referral to gastroenterology has been made. If the Pepcid proves effective, she may continue its use as needed. If the Pepcid does not provide relief, she may start Tums the week before her period. If all else fails, she may try omeprazole or  Nexium.    3. Health Maintenance.  - Routine labs have been ordered to screen for cholesterol, diabetes, and vitamin D levels.  - A lab appointment will be scheduled for these tests.    4. Irregular heartbeat.  - She had an EKG in the office last time and then had a follow-up with cardiology. She had a Holter monitor and a stress test. The doctor told her that she had a murmur, which she had never heard before in her life.  - No chest pressure or heart flutters have been reported recently.  - Heart rhythm is regular, heart rate is 78 bpm.  - No further cardiac symptoms noted.    5. Cervical cancer screening.  - She is up to date on her Pap smear. Dr. Trevino with [facility] is her OB-GYN, and she had her Pap smear through him.  - Records will be incorporated into her file.    6. Eye exam.  - She has not seen an eye doctor within the year but has seen a dentist.  - Advised to schedule an eye exam to check for glaucoma.    Follow-up  - The patient is scheduled for a follow-up visit in 1 year. If symptoms do not clear up, she will be seen earlier.    Patient Instructions     Health Maintenance Due   Topic Date Due    PAP SMEAR  Never done    ANNUAL PHYSICAL  07/20/2022    COVID-19 Vaccine (3 - 2024-25 season) 09/01/2024    12 hour fast for labs.    Trial Pepcid 20 once or twice daily the week before period.       Patient or patient representative verbalized consent for the use of Ambient Listening during the visit with  Melany Brown MD for chart documentation. 5/29/2025  14:44 EDT

## 2025-06-03 ENCOUNTER — LAB (OUTPATIENT)
Dept: FAMILY MEDICINE CLINIC | Facility: CLINIC | Age: 35
End: 2025-06-03
Payer: COMMERCIAL

## 2025-06-03 DIAGNOSIS — I49.9 IRREGULAR HEART BEAT: ICD-10-CM

## 2025-06-03 DIAGNOSIS — Z13.1 SCREENING FOR DIABETES MELLITUS: ICD-10-CM

## 2025-06-03 DIAGNOSIS — Z13.220 SCREENING CHOLESTEROL LEVEL: ICD-10-CM

## 2025-06-03 DIAGNOSIS — Z00.01 ENCOUNTER FOR ANNUAL GENERAL MEDICAL EXAMINATION WITH ABNORMAL FINDINGS IN ADULT: ICD-10-CM

## 2025-06-03 DIAGNOSIS — E55.9 VITAMIN D DEFICIENCY: ICD-10-CM

## 2025-06-03 LAB
25(OH)D3 SERPL-MCNC: 26.9 NG/ML (ref 30–100)
ALBUMIN SERPL-MCNC: 4.3 G/DL (ref 3.5–5.2)
ALBUMIN/GLOB SERPL: 2 G/DL
ALP SERPL-CCNC: 51 U/L (ref 39–117)
ALT SERPL W P-5'-P-CCNC: 11 U/L (ref 1–33)
ANION GAP SERPL CALCULATED.3IONS-SCNC: 9.4 MMOL/L (ref 5–15)
AST SERPL-CCNC: 22 U/L (ref 1–32)
BASOPHILS # BLD AUTO: 0.04 10*3/MM3 (ref 0–0.2)
BASOPHILS NFR BLD AUTO: 0.9 % (ref 0–1.5)
BILIRUB SERPL-MCNC: 0.4 MG/DL (ref 0–1.2)
BUN SERPL-MCNC: 14 MG/DL (ref 6–20)
BUN/CREAT SERPL: 19.7 (ref 7–25)
CALCIUM SPEC-SCNC: 9 MG/DL (ref 8.6–10.5)
CHLORIDE SERPL-SCNC: 103 MMOL/L (ref 98–107)
CHOLEST SERPL-MCNC: 116 MG/DL (ref 0–200)
CO2 SERPL-SCNC: 27.6 MMOL/L (ref 22–29)
CREAT SERPL-MCNC: 0.71 MG/DL (ref 0.57–1)
DEPRECATED RDW RBC AUTO: 39.3 FL (ref 37–54)
EGFRCR SERPLBLD CKD-EPI 2021: 113.9 ML/MIN/1.73
EOSINOPHIL # BLD AUTO: 0.29 10*3/MM3 (ref 0–0.4)
EOSINOPHIL NFR BLD AUTO: 6.4 % (ref 0.3–6.2)
ERYTHROCYTE [DISTWIDTH] IN BLOOD BY AUTOMATED COUNT: 12 % (ref 12.3–15.4)
GLOBULIN UR ELPH-MCNC: 2.2 GM/DL
GLUCOSE SERPL-MCNC: 90 MG/DL (ref 65–99)
HCT VFR BLD AUTO: 37.3 % (ref 34–46.6)
HDLC SERPL-MCNC: 46 MG/DL (ref 40–60)
HGB BLD-MCNC: 12.8 G/DL (ref 12–15.9)
IMM GRANULOCYTES # BLD AUTO: 0.01 10*3/MM3 (ref 0–0.05)
IMM GRANULOCYTES NFR BLD AUTO: 0.2 % (ref 0–0.5)
LDLC SERPL CALC-MCNC: 59 MG/DL (ref 0–100)
LDLC/HDLC SERPL: 1.33 {RATIO}
LYMPHOCYTES # BLD AUTO: 1.33 10*3/MM3 (ref 0.7–3.1)
LYMPHOCYTES NFR BLD AUTO: 29.2 % (ref 19.6–45.3)
MAGNESIUM SERPL-MCNC: 2.2 MG/DL (ref 1.6–2.6)
MCH RBC QN AUTO: 31.4 PG (ref 26.6–33)
MCHC RBC AUTO-ENTMCNC: 34.3 G/DL (ref 31.5–35.7)
MCV RBC AUTO: 91.4 FL (ref 79–97)
MONOCYTES # BLD AUTO: 0.43 10*3/MM3 (ref 0.1–0.9)
MONOCYTES NFR BLD AUTO: 9.4 % (ref 5–12)
NEUTROPHILS NFR BLD AUTO: 2.46 10*3/MM3 (ref 1.7–7)
NEUTROPHILS NFR BLD AUTO: 53.9 % (ref 42.7–76)
NRBC BLD AUTO-RTO: 0 /100 WBC (ref 0–0.2)
PLATELET # BLD AUTO: 227 10*3/MM3 (ref 140–450)
PMV BLD AUTO: 12.5 FL (ref 6–12)
POTASSIUM SERPL-SCNC: 3.6 MMOL/L (ref 3.5–5.2)
PROT SERPL-MCNC: 6.5 G/DL (ref 6–8.5)
RBC # BLD AUTO: 4.08 10*6/MM3 (ref 3.77–5.28)
SODIUM SERPL-SCNC: 140 MMOL/L (ref 136–145)
TRIGL SERPL-MCNC: 43 MG/DL (ref 0–150)
TSH SERPL DL<=0.05 MIU/L-ACNC: 2.65 UIU/ML (ref 0.27–4.2)
VLDLC SERPL-MCNC: 11 MG/DL (ref 5–40)
WBC NRBC COR # BLD AUTO: 4.56 10*3/MM3 (ref 3.4–10.8)

## 2025-06-03 PROCEDURE — 85025 COMPLETE CBC W/AUTO DIFF WBC: CPT | Performed by: PREVENTIVE MEDICINE

## 2025-06-03 PROCEDURE — 36415 COLL VENOUS BLD VENIPUNCTURE: CPT

## 2025-06-03 PROCEDURE — 83735 ASSAY OF MAGNESIUM: CPT | Performed by: PREVENTIVE MEDICINE

## 2025-06-03 PROCEDURE — 84443 ASSAY THYROID STIM HORMONE: CPT | Performed by: PREVENTIVE MEDICINE

## 2025-06-03 PROCEDURE — 80061 LIPID PANEL: CPT | Performed by: PREVENTIVE MEDICINE

## 2025-06-03 PROCEDURE — 80053 COMPREHEN METABOLIC PANEL: CPT | Performed by: PREVENTIVE MEDICINE

## 2025-06-03 PROCEDURE — 82306 VITAMIN D 25 HYDROXY: CPT | Performed by: PREVENTIVE MEDICINE

## 2025-06-04 ENCOUNTER — RESULTS FOLLOW-UP (OUTPATIENT)
Dept: FAMILY MEDICINE CLINIC | Facility: CLINIC | Age: 35
End: 2025-06-04
Payer: COMMERCIAL

## 2025-06-04 NOTE — PROGRESS NOTES
Vitamin D is slightly low I would increase to 1000 units a couple of days per week call if you have any other questions or concerns

## 2025-06-04 NOTE — TELEPHONE ENCOUNTER
Pt read lab results via phorus    CBC Auto Differential; Comprehensive Metabolic Panel; Magnesium; Lipid Panel; TSH Rfx On Abnormal To Free T4 (1 more orders)

## 2025-06-05 ENCOUNTER — TELEPHONE (OUTPATIENT)
Dept: FAMILY MEDICINE CLINIC | Facility: CLINIC | Age: 35
End: 2025-06-05
Payer: COMMERCIAL

## 2025-06-05 NOTE — TELEPHONE ENCOUNTER
"HUB TO RELAY    \"Call placed to patient to ask where she is wanting to go to see dermatology. The phone was answered but it sounded like she has a bad connection. I was not able to leave a msg\"  "

## 2025-06-06 NOTE — TELEPHONE ENCOUNTER
Name: Salvador German PABLO      Relationship: Self      Best Callback Number: [phone]       HUB PROVIDED THE RELAY MESSAGE FROM THE OFFICE      PATIENT: VOICED UNDERSTANDING AND HAS NO FURTHER QUESTIONS AT THIS TIME    ADDITIONAL INFORMATION: PATIENT STATES THAT SHE WOULD LIKE TO SEE ONE OF THE PROVIDERS THAT  MENTIONED IN [place]